# Patient Record
Sex: MALE | Employment: FULL TIME | URBAN - METROPOLITAN AREA
[De-identification: names, ages, dates, MRNs, and addresses within clinical notes are randomized per-mention and may not be internally consistent; named-entity substitution may affect disease eponyms.]

---

## 2018-01-24 ENCOUNTER — OFFICE VISIT (OUTPATIENT)
Dept: FAMILY MEDICINE CLINIC | Facility: CLINIC | Age: 28
End: 2018-01-24

## 2018-01-24 VITALS
DIASTOLIC BLOOD PRESSURE: 86 MMHG | BODY MASS INDEX: 30.93 KG/M2 | RESPIRATION RATE: 16 BRPM | HEART RATE: 101 BPM | HEIGHT: 76 IN | SYSTOLIC BLOOD PRESSURE: 107 MMHG | TEMPERATURE: 98.8 F | WEIGHT: 254 LBS | OXYGEN SATURATION: 97 %

## 2018-01-24 DIAGNOSIS — B34.9 VIRAL SYNDROME: Primary | ICD-10-CM

## 2018-01-24 DIAGNOSIS — J02.9 SORE THROAT: ICD-10-CM

## 2018-01-24 LAB
FLUAV+FLUBV AG NOSE QL IA.RAPID: NEGATIVE POS/NEG
FLUAV+FLUBV AG NOSE QL IA.RAPID: NEGATIVE POS/NEG
S PYO AG THROAT QL: NEGATIVE
VALID INTERNAL CONTROL?: YES
VALID INTERNAL CONTROL?: YES

## 2018-01-24 RX ORDER — DEXTROAMPHETAMINE SACCHARATE, AMPHETAMINE ASPARTATE, DEXTROAMPHETAMINE SULFATE AND AMPHETAMINE SULFATE 5; 5; 5; 5 MG/1; MG/1; MG/1; MG/1
1.5 TABLET ORAL DAILY
Refills: 0 | COMMUNITY
Start: 2017-12-29

## 2018-01-24 NOTE — LETTER
NOTIFICATION RETURN TO WORK  
 
1/24/2018 4:29 PM 
 
Mr. Max Pringle [address] [facility] [address] [address] To Whom It May Concern: 
 
Max Pringle is currently under the care of [address] [place] [address]. He will return to work on: 1/26/18 If there are questions or concerns please have the patient contact our office. Sincerely, Deshaun Ga MD

## 2018-01-24 NOTE — PROGRESS NOTES
Pinky Carvajal is a 32 y.o.  male presents today for acute care visit for cough/sore throat/vomiting/weakness for 3 days. Pt is in Room # 6.      1. Have you been to the ER, urgent care clinic since your last visit? Hospitalized since your last visit? No    2. Have you seen or consulted any other health care providers outside of the [address] [place] [address] since your last visit? Include any pap smears or colon screening. No    Health Maintenance deferred to PCP per pt.     Upcoming Appts  N/A    VORB:   Orders Placed This Encounter    AMB POC CHINA INFLUENZA A/B TEST    AMB POC RAPID STREP A   MD Domenic Neville LPN

## 2018-01-24 NOTE — MR AVS SNAPSHOT
[address] [id number] 
[phone] Patient: Hardeep Herrera MRN: [id number] MVN:[id number] Visit Information Date & Time Provider Department Dept. Phone Encounter #  
 1/24/2018  3:30 PM Kenn Aschoff, MD [facility] [phone] [id number] Follow-up Instructions Return if symptoms worsen or fail to improve. Upcoming Health Maintenance Date Due Pneumococcal 19-64 Medium Risk (1 of 1 - PPSV23) 6/5/2007 DTaP/Tdap/Td series (1 - Tdap) 6/5/2009 Influenza Age 5 to Adult 8/1/2017 Allergies as of 1/24/2018  Review Complete On: 10/22/2015 By: Shaw Parham MD  
 No Known Allergies Current Immunizations  Never Reviewed No immunizations on file. Not reviewed this visit You Were Diagnosed With   
  
 Codes Comments Nausea and vomiting, intractability of vomiting not specified, unspecified vomiting type    -  Primary ICD-10-CM: R11.2 ICD-9-CM: 787.01 Weakness     ICD-10-CM: R53.1 ICD-9-CM: 780.79 Sore throat     ICD-10-CM: J02.9 ICD-9-CM: 575 Vitals BP Pulse Temp Resp Height(growth percentile) Weight(growth percentile) 107/86 (BP 1 Location: Left arm, BP Patient Position: Sitting) (!) 101 98.8 °F (37.1 °C) (Oral) 16 6' 4\" (1.93 m) 254 lb (115.2 kg) SpO2 BMI Smoking Status 97% 30.92 kg/m2 Light Tobacco Smoker Vitals History BMI and BSA Data Body Mass Index Body Surface Area 30.92 kg/m 2 2.49 m 2 Preferred Pharmacy Pharmacy Name Phone [address], [address] [place] [facility] [address] [phone] Your Updated Medication List  
  
   
This list is accurate as of: 1/24/18  4:31 PM.  Always use your most recent med list.  
  
  
  
  
 ciprofloxacin HCl 0.3 % ophthalmic solution Commonly known as:  Som Garcia  
 Administer 1 Drop to left eye every two (2) hours. dextroamphetamine-amphetamine 20 mg tablet Commonly known as:  ADDERALL Take 1.5 Tabs by mouth daily. HYDROcodone-acetaminophen 7.5-325 mg per tablet Commonly known as:  Gomez Norlander Take 1 Tab by mouth every six (6) hours as needed for Pain. Max Daily Amount: 4 Tabs. metaxalone 800 mg tablet Commonly known as:  SKELAXIN Take 1 Tab by mouth four (4) times daily. TRINTELLIX 10 mg tablet Generic drug:  vortioxetine Take 10 mg by mouth daily. venlafaxine-SR 37.5 mg capsule Commonly known as:  EFFEXOR-XR Take 1 Cap by mouth daily. We Performed the Following AMB POC RAPID STREP A [40541 CPT(R)] AMB POC [place] INFLUENZA A/B TEST [78344 CPT(R)] Follow-up Instructions Return if symptoms worsen or fail to improve. Patient Instructions Sore Throat: Care Instructions Your Care Instructions Infection by bacteria or a virus causes most sore throats. Cigarette smoke, dry air, air pollution, allergies, and yelling can also cause a sore throat. Sore throats can be painful and annoying. Fortunately, most sore throats go away on their own. If you have a bacterial infection, your doctor may prescribe antibiotics. Follow-up care is a key part of your treatment and safety. Be sure to make and go to all appointments, and call your doctor if you are having problems. It's also a good idea to know your test results and keep a list of the medicines you take. How can you care for yourself at home? · If your doctor prescribed antibiotics, take them as directed. Do not stop taking them just because you feel better. You need to take the full course of antibiotics. · Gargle with warm salt water once an hour to help reduce swelling and relieve discomfort. Use 1 teaspoon of salt mixed in 1 cup of warm water.  
· Take an over-the-counter pain medicine, such as acetaminophen (Tylenol), ibuprofen (Advil, Motrin), or naproxen (Aleve). Read and follow all instructions on the label. · Be careful when taking over-the-counter cold or flu medicines and Tylenol at the same time. Many of these medicines have acetaminophen, which is Tylenol. Read the labels to make sure that you are not taking more than the recommended dose. Too much acetaminophen (Tylenol) can be harmful. · Drink plenty of fluids. Fluids may help soothe an irritated throat. Hot fluids, such as tea or soup, may help decrease throat pain. · Use over-the-counter throat lozenges to soothe pain. Regular cough drops or hard candy may also help. These should not be given to young children because of the risk of choking. · Do not smoke or allow others to smoke around you. If you need help quitting, talk to your doctor about stop-smoking programs and medicines. These can increase your chances of quitting for good. · Use a vaporizer or humidifier to add moisture to your bedroom. Follow the directions for cleaning the machine. When should you call for help? Call your doctor now or seek immediate medical care if: 
? · You have new or worse trouble swallowing. ? · Your sore throat gets much worse on one side. ? Watch closely for changes in your health, and be sure to contact your doctor if you do not get better as expected. Where can you learn more? Go to http://denny-chin.info/. Enter 062 441 80 [id number] in the search box to learn more about \"Sore Throat: Care Instructions. \" Current as of: May 12, 2017 Content Version: 11.4 © 2854-5201 Healthwise, Incorporated. Care instructions adapted under license by [employer] (which disclaims liability or warranty for this information). If you have questions about a medical condition or this instruction, always ask your healthcare professional. [place] [address] any warranty or liability for your use of this information. Introducing [facility]! Leonora Finley introduces [employer] patient portal. Now you can access parts of your medical record, email your doctor's office, and request medication refills online. 1. In your internet browser, go to [email or web address]. [email or web address] 2. Click on the First Time User? Click Here link in the Sign In box. You will see the New Member Sign Up page. 3. Enter your [employer] Access Code exactly as it appears below. You will not need to use this code after youve completed the sign-up process. If you do not sign up before the expiration date, you must request a new code. · [employer] Access Code: [email or web address] Expires: 4/24/2018  4:31 PM 
 
4. Enter the last four digits of your Social Security Number (xxxx) and Date of Birth (mm/dd/yyyy) as indicated and click Submit. You will be taken to the next sign-up page. 5. Create a [employer] ID. This will be your [employer] login ID and cannot be changed, so think of one that is secure and easy to remember. 6. Create a [employer] password. You can change your password at any time. 7. Enter your Password Reset Question and Answer. This can be used at a later time if you forget your password. 8. Enter your e-mail address. You will receive e-mail notification when new information is available in [address] 9. Click Sign Up. You can now view and download portions of your medical record. 10. Click the Download Summary menu link to download a portable copy of your medical information. If you have questions, please visit the Frequently Asked Questions section of the [employer] website. Remember, [employer] is NOT to be used for urgent needs. For medical emergencies, dial 911. Now available from your iPhone and Android! Please provide this summary of care documentation to your next provider. Your primary care clinician [facility]. If you have any questions after today's visit, please call [phone].

## 2018-01-24 NOTE — PATIENT INSTRUCTIONS
Sore Throat: Care Instructions  Your Care Instructions    Infection by bacteria or a virus causes most sore throats. Cigarette smoke, dry air, air pollution, allergies, and yelling can also cause a sore throat. Sore throats can be painful and annoying. Fortunately, most sore throats go away on their own. If you have a bacterial infection, your doctor may prescribe antibiotics. Follow-up care is a key part of your treatment and safety. Be sure to make and go to all appointments, and call your doctor if you are having problems. It's also a good idea to know your test results and keep a list of the medicines you take. How can you care for yourself at home? · If your doctor prescribed antibiotics, take them as directed. Do not stop taking them just because you feel better. You need to take the full course of antibiotics. · Gargle with warm salt water once an hour to help reduce swelling and relieve discomfort. Use 1 teaspoon of salt mixed in 1 cup of warm water. · Take an over-the-counter pain medicine, such as acetaminophen (Tylenol), ibuprofen (Advil, Motrin), or naproxen (Aleve). Read and follow all instructions on the label. · Be careful when taking over-the-counter cold or flu medicines and Tylenol at the same time. Many of these medicines have acetaminophen, which is Tylenol. Read the labels to make sure that you are not taking more than the recommended dose. Too much acetaminophen (Tylenol) can be harmful. · Drink plenty of fluids. Fluids may help soothe an irritated throat. Hot fluids, such as tea or soup, may help decrease throat pain. · Use over-the-counter throat lozenges to soothe pain. Regular cough drops or hard candy may also help. These should not be given to young children because of the risk of choking. · Do not smoke or allow others to smoke around you. If you need help quitting, talk to your doctor about stop-smoking programs and medicines.  These can increase your chances of quitting for good. · Use a vaporizer or humidifier to add moisture to your bedroom. Follow the directions for cleaning the machine. When should you call for help? Call your doctor now or seek immediate medical care if:  ? · You have new or worse trouble swallowing. ? · Your sore throat gets much worse on one side. ? Watch closely for changes in your health, and be sure to contact your doctor if you do not get better as expected. Where can you learn more? Go to http://denny-chin.info/. Enter [id number] in the search box to learn more about \"Sore Throat: Care Instructions. \"  Current as of: May 12, 2017  Content Version: 11.4  © 7315-6539 Healthwise, Incorporated. Care instructions adapted under license by [employer] (which disclaims liability or warranty for this information). If you have questions about a medical condition or this instruction, always ask your healthcare professional. [address] any warranty or liability for your use of this information.

## 2018-01-24 NOTE — PROGRESS NOTES
Subjective:   Anthony Durant is a 32 y.o.  male who presents for Vomiting; Fatigue; Sore Throat; and Cough    Pt reports a 3 day history of cold symptoms including sore throat, fatigue, and minimally productive cough of yellow sputum. He reports onset of symptoms with vomiting of recently consumed food and 2 episodes non-bloody diarrhea that have since resolved. He denies fever/chills, headache, SOB, wheezing, chest pain. Review of Systems   Constitutional: Positive for malaise/fatigue. Negative for chills and fever. HENT: Positive for congestion and sore throat. Negative for ear pain and sinus pain. Eyes: Negative. Respiratory: Positive for cough and sputum production. Negative for hemoptysis, shortness of breath, wheezing and stridor. Cardiovascular: Negative. Gastrointestinal: Negative. Genitourinary: Negative. Musculoskeletal: Negative. Skin: Negative. Current Outpatient Prescriptions on File Prior to Visit   Medication Sig Dispense Refill    HYDROcodone-acetaminophen (NORCO) 7.5-325 mg per tablet Take 1 Tab by mouth every six (6) hours as needed for Pain. Max Daily Amount: 4 Tabs. 16 Tab 0    venlafaxine-SR (EFFEXOR-XR) 37.5 mg capsule Take 1 Cap by mouth daily. 7 Cap 0    metaxalone (SKELAXIN) 800 mg tablet Take 1 Tab by mouth four (4) times daily. 30 Tab 0    ciprofloxacin HCl (CILOXAN) 0.3 % ophthalmic solution Administer 1 Drop to left eye every two (2) hours. 2.5 mL 0     No current facility-administered medications on file prior to visit. Reviewed PmHx, RxHx, FmHx, SocHx, AllgHx and updated and dated in the chart. Nurse notes were reviewed and are correct    Objective:     Vitals:    01/24/18 1545   BP: 107/86   Pulse: (!) 101   Resp: 16   Temp: 98.8 °F (37.1 °C)   TempSrc: Oral   SpO2: 97%   Weight: 254 lb (115.2 kg)   Height: 6' 4\" (1.93 m)     Physical Exam   Constitutional: He is oriented to person, place, and time.  He appears well-developed and well-nourished. No distress. HENT:   Head: Normocephalic and atraumatic. Right Ear: External ear normal.   Left Ear: External ear normal.   Nose: Nose normal.   Mouth/Throat: Oropharynx is clear and moist. No oropharyngeal exudate. Eyes: Conjunctivae and EOM are normal. Pupils are equal, round, and reactive to light. Neck: Normal range of motion. Neck supple. Cardiovascular: Normal rate, regular rhythm, normal heart sounds and intact distal pulses. Pulmonary/Chest: Effort normal and breath sounds normal.   Abdominal: Soft. Bowel sounds are normal.   Musculoskeletal: He exhibits no edema. Lymphadenopathy:     He has no cervical adenopathy. Neurological: He is alert and oriented to person, place, and time. Skin: Skin is warm and dry. Nursing note and vitals reviewed. Assessment/ Plan:     Diagnoses and all orders for this visit:    1. Viral syndrome  -     AMB POC CH[place] INFLUENZA A/B TEST - negative  -     AMB POC RAPID STREP A - negative  -     Advised to drink plenty of fluids, obtain adequate rest, may take acetaminophen prn for discomfort    2. Sore throat  -     AMB POC CHINA INFLUENZA A/B TEST - negative  -     AMB POC RAPID STREP A -negative  -     Likely viral etiology. Advised to perform warm saline gargles. May use acetaminophen prn. I have discussed the diagnosis with the patient and the intended plan as seen in the above orders. The patient verbalized understanding and agrees with the plan.     Follow-up Disposition: Not on [address] [facility], MD

## 2018-11-27 ENCOUNTER — OFFICE VISIT (OUTPATIENT)
Dept: FAMILY MEDICINE CLINIC | Facility: CLINIC | Age: 28
End: 2018-11-27

## 2018-11-27 VITALS
DIASTOLIC BLOOD PRESSURE: 64 MMHG | RESPIRATION RATE: 13 BRPM | HEART RATE: 69 BPM | SYSTOLIC BLOOD PRESSURE: 124 MMHG | WEIGHT: 263 LBS | HEIGHT: 76 IN | OXYGEN SATURATION: 98 % | BODY MASS INDEX: 32.03 KG/M2 | TEMPERATURE: 98.3 F

## 2018-11-27 DIAGNOSIS — F17.200 SMOKING: ICD-10-CM

## 2018-11-27 DIAGNOSIS — B34.9 VIRAL SYNDROME: Primary | ICD-10-CM

## 2018-11-27 LAB
QUICKVUE INFLUENZA TEST: NEGATIVE
VALID INTERNAL CONTROL?: YES

## 2018-11-27 RX ORDER — ESCITALOPRAM OXALATE 5 MG/1
TABLET ORAL DAILY
COMMUNITY

## 2018-11-27 NOTE — PROGRESS NOTES
Subjective:   Juan Lee is a 25 y.o.  male who presents for c/o of flu-like symptoms. Mr. Tyler Evangelista reports a history of subjective fever/chills, nasal congestion, non productive cough, sore throat, and body aches that began 4 days ago. He denies headache, SOB, or chest pain. He denies sick contacts. He has gargled with warm salt water with. Pt reports symptoms have improved over the past 2 days. Pt is a current smoker. Review of Systems   Constitutional: Positive for chills and fever. Negative for malaise/fatigue and weight loss. HENT: Positive for congestion and sore throat. Negative for ear pain and sinus pain. Respiratory: Positive for cough. Negative for hemoptysis, sputum production, shortness of breath and wheezing. Cardiovascular: Negative for chest pain, palpitations, orthopnea, claudication, leg swelling and PND. Gastrointestinal: Negative for abdominal pain, nausea and vomiting. Musculoskeletal: Positive for myalgias. Skin: Negative for rash. Neurological: Negative for dizziness and headaches. Current Outpatient Medications on File Prior to Visit   Medication Sig Dispense Refill    escitalopram oxalate (LEXAPRO) 5 mg tablet Take  by mouth daily.  dextroamphetamine-amphetamine (ADDERALL) 20 mg tablet Take 1.5 Tabs by mouth daily. 0    vortioxetine (TRINTELLIX) 10 mg tablet Take 10 mg by mouth daily.  HYDROcodone-acetaminophen (NORCO) 7.5-325 mg per tablet Take 1 Tab by mouth every six (6) hours as needed for Pain. Max Daily Amount: 4 Tabs. 16 Tab 0    venlafaxine-SR (EFFEXOR-XR) 37.5 mg capsule Take 1 Cap by mouth daily. 7 Cap 0    metaxalone (SKELAXIN) 800 mg tablet Take 1 Tab by mouth four (4) times daily. 30 Tab 0    ciprofloxacin HCl (CILOXAN) 0.3 % ophthalmic solution Administer 1 Drop to left eye every two (2) hours. 2.5 mL 0     No current facility-administered medications on file prior to visit.         Reviewed PmHx, RxHx, FmHx, SocHx, AllgHx and updated and dated in the chart. Nurse notes were reviewed and are correct    Objective:     Vitals:    11/27/18 1609   BP: 124/64   Pulse: 69   Resp: 13   Temp: 98.3 °F (36.8 °C)   TempSrc: Oral   SpO2: 98%   Weight: 263 lb (119.3 kg)   Height: 6' 4\" (1.93 m)     Physical Exam   Constitutional: He is oriented to person, place, and time. He appears well-developed and well-nourished. Non-toxic appearance. He does not appear ill. No distress. HENT:   Head: Normocephalic and atraumatic. Mouth/Throat: Oropharynx is clear and moist.   Eyes: Conjunctivae and EOM are normal. Pupils are equal, round, and reactive to light. Neck: Normal range of motion. Neck supple. Cardiovascular: Normal rate, regular rhythm, normal heart sounds and intact distal pulses. Pulmonary/Chest: Effort normal and breath sounds normal.   Abdominal: Soft. Bowel sounds are normal.   Musculoskeletal: He exhibits no edema. Lymphadenopathy:     He has no cervical adenopathy. Neurological: He is alert and oriented to person, place, and time. Skin: Skin is warm and dry. Capillary refill takes less than 2 seconds. Psychiatric: He has a normal mood and affect. His behavior is normal.   Nursing note and vitals reviewed. Assessment/ Plan:     Diagnoses and all orders for this visit:    1. Viral syndrome        -     Pt with 4 days of flu-like symptoms, now showing improvement over the past 2 days.  -     AMB POC RAPID INFLUENZA TEST negative rapid influenza test.  Likely viral URI. Advised to drink plenty of fluids, obtain adequate rest, may take ibuprofen prn for body aches and fever/chills. Monitor symptoms and RTC if no symptoms do not continue to improve. 2. Smoking        -     Counseled on smoking cessation. I have discussed the diagnosis with the patient and the intended plan as seen in the above orders. The patient verbalized understanding and agrees with the plan.     Follow-up Disposition:  Return if symptoms worsen or fail to improve.     Anitha Flores MD

## 2018-11-27 NOTE — PROGRESS NOTES
Marylu De Santiago is a 25 y.o.@ presents today for office visit for same day. Pt is in Room # 5.     1. Have you been to the ER, urgent care clinic since your last visit? Hospitalized since your last visit? No    2. Have you seen or consulted any other health care providers outside of the [address] [place] [address] since your last visit? Include any pap smears or colon screening. No         Health Maintenance reviewed - pt will update at next raquel. Pt is to ill . Luisito Patton     Requested Prescriptions      No prescriptions requested or ordered in this encounter       Visit Vitals  /64 (BP 1 Location: Right arm, BP Patient Position: Sitting)   Pulse 69   Temp 98.3 °F (36.8 °C) (Oral)   Resp 13   Ht 6' 4\" (1.93 m)   Wt 263 lb (119.3 kg)   SpO2 98%   BMI 32.01 kg/m²          Upcoming Appts  No.     VORB:    MD Kapil Bryant LPN

## 2018-11-27 NOTE — PATIENT INSTRUCTIONS
